# Patient Record
Sex: MALE | Race: BLACK OR AFRICAN AMERICAN | URBAN - METROPOLITAN AREA
[De-identification: names, ages, dates, MRNs, and addresses within clinical notes are randomized per-mention and may not be internally consistent; named-entity substitution may affect disease eponyms.]

---

## 2018-02-19 ENCOUNTER — HOSPITAL ENCOUNTER (OUTPATIENT)
Dept: SURGERY | Age: 38
Discharge: HOME | End: 2018-02-19
Attending: ORTHOPAEDIC SURGERY | Admitting: ORTHOPAEDIC SURGERY

## 2018-02-19 VITALS
OXYGEN SATURATION: 97 % | DIASTOLIC BLOOD PRESSURE: 89 MMHG | SYSTOLIC BLOOD PRESSURE: 125 MMHG | HEART RATE: 96 BPM | RESPIRATION RATE: 12 BRPM | TEMPERATURE: 98.1 F

## 2018-02-19 DIAGNOSIS — D36.10 SCHWANNOMA: Primary | ICD-10-CM

## 2018-02-19 RX ORDER — OXYCODONE HYDROCHLORIDE AND ACETAMINOPHEN 5; 325 MG/1; MG/1
2 TABLET ORAL PRN
Status: COMPLETED | OUTPATIENT
Start: 2018-02-19 | End: 2018-02-19

## 2018-02-19 RX ORDER — MEPERIDINE HYDROCHLORIDE 50 MG/ML
12.5 INJECTION INTRAMUSCULAR; INTRAVENOUS; SUBCUTANEOUS EVERY 5 MIN PRN
Status: DISCONTINUED | OUTPATIENT
Start: 2018-02-19 | End: 2018-02-20 | Stop reason: HOSPADM

## 2018-02-19 RX ORDER — HYDROCODONE BITARTRATE AND ACETAMINOPHEN 5; 325 MG/1; MG/1
1 TABLET ORAL EVERY 6 HOURS PRN
Qty: 16 TABLET | Refills: 0 | Status: SHIPPED | OUTPATIENT
Start: 2018-02-19 | End: 2018-02-23

## 2018-02-19 RX ORDER — SODIUM CHLORIDE, SODIUM LACTATE, POTASSIUM CHLORIDE, CALCIUM CHLORIDE 600; 310; 30; 20 MG/100ML; MG/100ML; MG/100ML; MG/100ML
INJECTION, SOLUTION INTRAVENOUS
Status: DISCONTINUED
Start: 2018-02-19 | End: 2018-02-20 | Stop reason: HOSPADM

## 2018-02-19 RX ORDER — OXYCODONE HYDROCHLORIDE AND ACETAMINOPHEN 5; 325 MG/1; MG/1
1 TABLET ORAL PRN
Status: COMPLETED | OUTPATIENT
Start: 2018-02-19 | End: 2018-02-19

## 2018-02-19 RX ORDER — SODIUM CHLORIDE, SODIUM LACTATE, POTASSIUM CHLORIDE, CALCIUM CHLORIDE 600; 310; 30; 20 MG/100ML; MG/100ML; MG/100ML; MG/100ML
INJECTION, SOLUTION INTRAVENOUS CONTINUOUS
Status: DISCONTINUED | OUTPATIENT
Start: 2018-02-19 | End: 2018-02-20 | Stop reason: HOSPADM

## 2018-02-19 RX ORDER — ONDANSETRON 2 MG/ML
4 INJECTION INTRAMUSCULAR; INTRAVENOUS EVERY 10 MIN PRN
Status: DISCONTINUED | OUTPATIENT
Start: 2018-02-19 | End: 2018-02-20 | Stop reason: HOSPADM

## 2018-02-19 RX ORDER — HYDRALAZINE HYDROCHLORIDE 20 MG/ML
5 INJECTION INTRAMUSCULAR; INTRAVENOUS EVERY 10 MIN PRN
Status: DISCONTINUED | OUTPATIENT
Start: 2018-02-19 | End: 2018-02-20 | Stop reason: HOSPADM

## 2018-02-19 RX ORDER — LIDOCAINE HYDROCHLORIDE 10 MG/ML
1 INJECTION, SOLUTION EPIDURAL; INFILTRATION; INTRACAUDAL; PERINEURAL
Status: ACTIVE | OUTPATIENT
Start: 2018-02-19 | End: 2018-02-19

## 2018-02-19 RX ORDER — LABETALOL HYDROCHLORIDE 5 MG/ML
5 INJECTION, SOLUTION INTRAVENOUS EVERY 10 MIN PRN
Status: DISCONTINUED | OUTPATIENT
Start: 2018-02-19 | End: 2018-02-20 | Stop reason: HOSPADM

## 2018-02-19 RX ADMIN — Medication 0.5 MG: at 16:20

## 2018-02-19 RX ADMIN — Medication 0.5 MG: at 17:01

## 2018-02-19 RX ADMIN — Medication 0.5 MG: at 16:06

## 2018-02-19 RX ADMIN — OXYCODONE HYDROCHLORIDE AND ACETAMINOPHEN 2 TABLET: 5; 325 TABLET ORAL at 17:15

## 2018-02-19 RX ADMIN — Medication 0.5 MG: at 16:00

## 2018-02-19 RX ADMIN — Medication 0.5 MG: at 16:30

## 2018-02-19 ASSESSMENT — PAIN SCALES - GENERAL
PAINLEVEL_OUTOF10: 8
PAINLEVEL_OUTOF10: 7
PAINLEVEL_OUTOF10: 9

## 2018-02-26 NOTE — OP NOTE
[place], [place] [address]                                 OPERATIVE REPORT    PATIENT NAME: Jamin LIM                     :        1980  MED REC NO:   [id number]                          ROOM:  ACCOUNT NO:   [de-identified]                          ADMIT DATE: 2018  PROVIDER:     Vaibhav Chase MD    DATE OF PROCEDURE:  2018    PREOPERATIVE DIAGNOSIS:  Left foot mass. OPERATION PERFORMED:  1. Biopsy of left foot mass. 2.  Excision of left foot schwannoma. SURGEON:  Vaibhav Chase MD    COMPLICATIONS:  None. DRAINS AND TUBES:  None. INDICATIONS FOR SURGERY:  This is a 60-year-old male with a left foot mass,  presents today for above procedure. Risks, benefits, and alternatives were  discussed including nerve injury, vascular injury, infection, DVT, and  other potential complications were discussed, and he consented to the  procedure. OPERATIVE PROCEDURE:  The patient was taken to the operating room, placed  supine on the operating table after induction of general anesthesia. A  non-sterile tourniquet was placed on the left thigh. The left leg was then  prepped and draped in the usual sterile fashion. The leg was elevated,  exsanguinated, and tourniquet inflated. A longitudinal incision was made  over the mass then the skin into the subcutaneous tissue. With this mass  identified, it was biopsied. Frozen returned showed that of a schwannoma. Circumferential dissection was then carried down the mass. The entire mass  was excised in one piece, sent to pathology. The mass was measured 2.8 x  1.3 x 1.1 cm, and then the wound was irrigated and closed. Sterile  dressing was applied. The patient was then taken from the operating room  to Recovery, where he arrived in a stable condition.         Jeferson Parada MD    D: 2018 15:26:37       T: 2018 18:46:44